# Patient Record
Sex: MALE | Race: WHITE | NOT HISPANIC OR LATINO | Employment: OTHER | URBAN - METROPOLITAN AREA
[De-identification: names, ages, dates, MRNs, and addresses within clinical notes are randomized per-mention and may not be internally consistent; named-entity substitution may affect disease eponyms.]

---

## 2017-12-12 ENCOUNTER — HOSPITAL ENCOUNTER (EMERGENCY)
Facility: HOSPITAL | Age: 77
End: 2017-12-12
Attending: EMERGENCY MEDICINE
Payer: MEDICARE

## 2017-12-12 VITALS
HEIGHT: 69 IN | DIASTOLIC BLOOD PRESSURE: 93 MMHG | SYSTOLIC BLOOD PRESSURE: 143 MMHG | RESPIRATION RATE: 20 BRPM | WEIGHT: 222 LBS | HEART RATE: 85 BPM | TEMPERATURE: 98 F | BODY MASS INDEX: 32.88 KG/M2 | OXYGEN SATURATION: 95 %

## 2017-12-12 DIAGNOSIS — R05.9 COUGH: Primary | ICD-10-CM

## 2017-12-12 DIAGNOSIS — R07.9 CHEST PAIN: ICD-10-CM

## 2017-12-12 LAB
ALBUMIN SERPL BCP-MCNC: 2.8 G/DL
ALP SERPL-CCNC: 109 U/L
ALT SERPL W/O P-5'-P-CCNC: 18 U/L
ANION GAP SERPL CALC-SCNC: 12 MMOL/L
AST SERPL-CCNC: 16 U/L
BASOPHILS # BLD AUTO: 0.03 K/UL
BASOPHILS NFR BLD: 0.5 %
BILIRUB SERPL-MCNC: 0.5 MG/DL
BNP SERPL-MCNC: <10 PG/ML
BUN SERPL-MCNC: 12 MG/DL
CALCIUM SERPL-MCNC: 8.6 MG/DL
CHLORIDE SERPL-SCNC: 105 MMOL/L
CO2 SERPL-SCNC: 20 MMOL/L
CREAT SERPL-MCNC: 0.8 MG/DL
DIFFERENTIAL METHOD: ABNORMAL
EOSINOPHIL # BLD AUTO: 0.2 K/UL
EOSINOPHIL NFR BLD: 2.9 %
ERYTHROCYTE [DISTWIDTH] IN BLOOD BY AUTOMATED COUNT: 14.3 %
EST. GFR  (AFRICAN AMERICAN): >60 ML/MIN/1.73 M^2
EST. GFR  (NON AFRICAN AMERICAN): >60 ML/MIN/1.73 M^2
GLUCOSE SERPL-MCNC: 123 MG/DL
HCT VFR BLD AUTO: 33.4 %
HGB BLD-MCNC: 11.5 G/DL
LYMPHOCYTES # BLD AUTO: 1.8 K/UL
LYMPHOCYTES NFR BLD: 28.9 %
MCH RBC QN AUTO: 31.1 PG
MCHC RBC AUTO-ENTMCNC: 34.4 G/DL
MCV RBC AUTO: 90 FL
MONOCYTES # BLD AUTO: 0.6 K/UL
MONOCYTES NFR BLD: 9.3 %
NEUTROPHILS # BLD AUTO: 3.5 K/UL
NEUTROPHILS NFR BLD: 57.4 %
PLATELET # BLD AUTO: 262 K/UL
PMV BLD AUTO: 8.8 FL
POTASSIUM SERPL-SCNC: 4.4 MMOL/L
PROT SERPL-MCNC: 7.8 G/DL
RBC # BLD AUTO: 3.7 M/UL
SODIUM SERPL-SCNC: 137 MMOL/L
TROPONIN I SERPL DL<=0.01 NG/ML-MCNC: <0.006 NG/ML
WBC # BLD AUTO: 6.13 K/UL

## 2017-12-12 PROCEDURE — 94640 AIRWAY INHALATION TREATMENT: CPT | Performed by: GENERAL PRACTICE

## 2017-12-12 PROCEDURE — 93010 ELECTROCARDIOGRAM REPORT: CPT | Mod: ,,, | Performed by: INTERNAL MEDICINE

## 2017-12-12 PROCEDURE — 83880 ASSAY OF NATRIURETIC PEPTIDE: CPT

## 2017-12-12 PROCEDURE — 99285 EMERGENCY DEPT VISIT HI MDM: CPT | Mod: 25

## 2017-12-12 PROCEDURE — 96375 TX/PRO/DX INJ NEW DRUG ADDON: CPT

## 2017-12-12 PROCEDURE — 99900035 HC TECH TIME PER 15 MIN (STAT): Performed by: GENERAL PRACTICE

## 2017-12-12 PROCEDURE — 93005 ELECTROCARDIOGRAM TRACING: CPT | Performed by: GENERAL PRACTICE

## 2017-12-12 PROCEDURE — 96374 THER/PROPH/DIAG INJ IV PUSH: CPT

## 2017-12-12 PROCEDURE — 80053 COMPREHEN METABOLIC PANEL: CPT

## 2017-12-12 PROCEDURE — 85025 COMPLETE CBC W/AUTO DIFF WBC: CPT

## 2017-12-12 PROCEDURE — 25000242 PHARM REV CODE 250 ALT 637 W/ HCPCS: Performed by: EMERGENCY MEDICINE

## 2017-12-12 PROCEDURE — 93005 ELECTROCARDIOGRAM TRACING: CPT

## 2017-12-12 PROCEDURE — 94760 N-INVAS EAR/PLS OXIMETRY 1: CPT | Performed by: GENERAL PRACTICE

## 2017-12-12 PROCEDURE — 84484 ASSAY OF TROPONIN QUANT: CPT

## 2017-12-12 PROCEDURE — 63600175 PHARM REV CODE 636 W HCPCS: Performed by: EMERGENCY MEDICINE

## 2017-12-12 RX ORDER — FERROUS GLUCONATE 324(38)MG
324 TABLET ORAL
COMMUNITY

## 2017-12-12 RX ORDER — VALPROIC ACID 250 MG/5ML
500 SOLUTION ORAL NIGHTLY
COMMUNITY

## 2017-12-12 RX ORDER — METFORMIN HYDROCHLORIDE 500 MG/1
500 TABLET ORAL 2 TIMES DAILY WITH MEALS
COMMUNITY

## 2017-12-12 RX ORDER — PAROXETINE HYDROCHLORIDE 20 MG/1
20 TABLET, FILM COATED ORAL EVERY MORNING
COMMUNITY

## 2017-12-12 RX ORDER — AMLODIPINE BESYLATE 10 MG/1
10 TABLET ORAL DAILY
COMMUNITY

## 2017-12-12 RX ORDER — ACETAMINOPHEN 325 MG/1
325 TABLET ORAL EVERY 6 HOURS PRN
COMMUNITY

## 2017-12-12 RX ORDER — GUAIFENESIN 100 MG/5ML
200 SOLUTION ORAL 4 TIMES DAILY PRN
COMMUNITY

## 2017-12-12 RX ORDER — TAMSULOSIN HYDROCHLORIDE 0.4 MG/1
1 CAPSULE ORAL DAILY
COMMUNITY

## 2017-12-12 RX ORDER — OXYBUTYNIN CHLORIDE 10 MG/1
10 TABLET, EXTENDED RELEASE ORAL NIGHTLY
COMMUNITY

## 2017-12-12 RX ORDER — QUETIAPINE FUMARATE 50 MG/1
50 TABLET, FILM COATED ORAL NIGHTLY
COMMUNITY

## 2017-12-12 RX ORDER — IPRATROPIUM BROMIDE AND ALBUTEROL SULFATE 2.5; .5 MG/3ML; MG/3ML
3 SOLUTION RESPIRATORY (INHALATION)
Status: COMPLETED | OUTPATIENT
Start: 2017-12-12 | End: 2017-12-12

## 2017-12-12 RX ORDER — VENLAFAXINE HYDROCHLORIDE 150 MG/1
75 CAPSULE, EXTENDED RELEASE ORAL DAILY
COMMUNITY

## 2017-12-12 RX ORDER — ALPRAZOLAM 0.25 MG/1
0.25 TABLET ORAL 4 TIMES DAILY PRN
COMMUNITY

## 2017-12-12 RX ORDER — HYDROMORPHONE HYDROCHLORIDE 4 MG/1
4 TABLET ORAL EVERY 4 HOURS PRN
COMMUNITY

## 2017-12-12 RX ORDER — PREDNISONE 10 MG/1
10 TABLET ORAL DAILY
Qty: 5 TABLET | Refills: 0 | Status: SHIPPED | OUTPATIENT
Start: 2017-12-12 | End: 2017-12-17

## 2017-12-12 RX ORDER — MULTIVITAMIN
1 TABLET ORAL DAILY
COMMUNITY

## 2017-12-12 RX ORDER — ALBUTEROL SULFATE 90 UG/1
2 AEROSOL, METERED RESPIRATORY (INHALATION) EVERY 4 HOURS PRN
Qty: 1 INHALER | Refills: 1 | Status: SHIPPED | OUTPATIENT
Start: 2017-12-12 | End: 2018-12-12

## 2017-12-12 RX ORDER — GABAPENTIN 400 MG/1
400 CAPSULE ORAL 3 TIMES DAILY
COMMUNITY

## 2017-12-12 RX ORDER — CARVEDILOL 6.25 MG/1
6.25 TABLET ORAL 2 TIMES DAILY WITH MEALS
COMMUNITY

## 2017-12-12 RX ORDER — FINASTERIDE 5 MG/1
5 TABLET, FILM COATED ORAL DAILY
COMMUNITY

## 2017-12-12 RX ORDER — TRAMADOL HYDROCHLORIDE 50 MG/1
50 TABLET ORAL EVERY 6 HOURS PRN
COMMUNITY

## 2017-12-12 RX ORDER — METHYLPREDNISOLONE SOD SUCC 125 MG
125 VIAL (EA) INJECTION
Status: COMPLETED | OUTPATIENT
Start: 2017-12-12 | End: 2017-12-12

## 2017-12-12 RX ADMIN — METHYLPREDNISOLONE SODIUM SUCCINATE 125 MG: 125 INJECTION, POWDER, FOR SOLUTION INTRAMUSCULAR; INTRAVENOUS at 08:12

## 2017-12-12 RX ADMIN — LORAZEPAM 1 MG: 2 INJECTION INTRAMUSCULAR; INTRAVENOUS at 08:12

## 2017-12-12 RX ADMIN — IPRATROPIUM BROMIDE AND ALBUTEROL SULFATE 3 ML: .5; 3 SOLUTION RESPIRATORY (INHALATION) at 08:12

## 2017-12-12 NOTE — DISCHARGE INSTRUCTIONS
_______________    He almost certainly has COPD.    Ask his doctor to review the new prescriptions.    Encourage quitting smoking.    Return to the ER as needed.    ________________

## 2017-12-12 NOTE — ED NOTES
Pt belligerent and yelling at staff. Attempted to redirect patient but he is yelling that he can't breathe. Security at bedside.

## 2017-12-12 NOTE — ED PROVIDER NOTES
Encounter Date: 12/12/2017       History     Chief Complaint   Patient presents with    Cough     cough x 2 days. From [facility].      Transported here from nursing home, primary complaint seems to be cough and shortness of breath.  He is a regular smoker, having smoked just before he got on the ambulance stretcher.  He has an extensive medication list including oxygen when necessary but apparently no inhalers and he does not seem to carry a diagnosis of COPD.  He is on chronic pain medication including 4 mg Dilaudid which he had this morning at 5:00.  He has known dementia and agitation.  He may have some sputum production, this is not verified.  No known fever.  He has chronic left hip pain without change.  He has some mild headache.  He is angry, demanding, hostile, and unreasonable with poor interaction with all health care providers, seemingly at baseline as per descriptions and related to dementia.  Denies other specific physical complaints and is in fact demanding to go back to the nursing home.      The history is provided by the patient, the nursing home and the EMS personnel. No  was used.     Review of patient's allergies indicates:   Allergen Reactions    Fentanyl      Respiratory failure     Past Medical History:   Diagnosis Date    Alzheimer disease     Anemia     Anxiety     Arthritis     Faith esophagus     BPH (benign prostatic hyperplasia)     Cerebrovascular disease     Dementia     Depression     GERD (gastroesophageal reflux disease)     HLD (hyperlipidemia)     Hypertension     URI (upper respiratory infection)      Past Surgical History:   Procedure Laterality Date    BACK SURGERY      HIP SURGERY Left      History reviewed. No pertinent family history.  Social History   Substance Use Topics    Smoking status: Current Every Day Smoker     Packs/day: 2.00     Types: Cigarettes    Smokeless tobacco: Never Used    Alcohol use No     Review of  Systems   Constitutional: Negative for chills and fever.   HENT: Negative for congestion, facial swelling, nosebleeds and sinus pressure.    Eyes: Negative for pain and redness.   Respiratory: Positive for cough and shortness of breath. Negative for chest tightness and wheezing.    Cardiovascular: Negative for chest pain, palpitations and leg swelling.   Gastrointestinal: Negative for abdominal distention, abdominal pain, diarrhea, nausea and vomiting.   Endocrine: Negative for cold intolerance, polydipsia and polyphagia.   Genitourinary: Negative for difficulty urinating, dysuria, frequency and hematuria.   Musculoskeletal: Negative for arthralgias, back pain, myalgias and neck pain.   Skin: Negative for color change and rash.   Neurological: Negative for dizziness, weakness, numbness and headaches.   Hematological: Negative for adenopathy. Does not bruise/bleed easily.   Psychiatric/Behavioral: Positive for agitation and behavioral problems.   All other systems reviewed and are negative.      Physical Exam     Initial Vitals [12/12/17 0738]   BP Pulse Resp Temp SpO2   (!) 154/80 83 18 98.3 °F (36.8 °C) 96 %      MAP       104.67         Physical Exam    Nursing note and vitals reviewed.  Constitutional: He appears well-developed and well-nourished. He is not diaphoretic. No distress.   HENT:   Head: Normocephalic and atraumatic.   Mouth/Throat: Oropharynx is clear and moist. No oropharyngeal exudate.   Eyes: Conjunctivae and EOM are normal. Pupils are equal, round, and reactive to light. Right eye exhibits no discharge. Left eye exhibits no discharge. No scleral icterus.   Neck: Normal range of motion. Neck supple. No thyromegaly present. No tracheal deviation present. No JVD present.   Cardiovascular: Normal rate, regular rhythm and normal heart sounds. Exam reveals no gallop and no friction rub.    No murmur heard.  Pulmonary/Chest: No respiratory distress. He has no wheezes. He has rhonchi. He has no rales. He  exhibits no tenderness.   Mild scattered rhonchi   Abdominal: Soft. Bowel sounds are normal. He exhibits no distension and no mass. There is no tenderness. There is no rebound and no guarding.   Musculoskeletal: Normal range of motion. He exhibits no edema or tenderness.   Lymphadenopathy:     He has no cervical adenopathy.   Neurological: He is alert and oriented to person, place, and time. He has normal strength. No cranial nerve deficit.   Skin: Skin is warm and dry. No rash noted. No erythema.   Psychiatric: Thought content normal.   Alert and oriented ×4 but mild to moderate dementia is evident, manifest primarily by aggressive, hostile, and unreasonable outbursts.  Angry, demanding, poor social skills.  No acute change suspected, this does appear to be at baseline as per her nursing home.         ED Course   Procedures  Labs Reviewed   CBC W/ AUTO DIFFERENTIAL - Abnormal; Notable for the following:        Result Value    RBC 3.70 (*)     Hemoglobin 11.5 (*)     Hematocrit 33.4 (*)     MCH 31.1 (*)     MPV 8.8 (*)     All other components within normal limits   COMPREHENSIVE METABOLIC PANEL - Abnormal; Notable for the following:     CO2 20 (*)     Glucose 123 (*)     Calcium 8.6 (*)     Albumin 2.8 (*)     All other components within normal limits   TROPONIN I   B-TYPE NATRIURETIC PEPTIDE     EKG Readings: (Independently Interpreted)   Initial Reading: No STEMI. Rhythm: Normal Sinus Rhythm. Heart Rate: 86. Ectopy: No Ectopy. Conduction: Normal. Axis: Normal.   Long QT/ SA/ borderline EKG       Imaging Results          X-Ray Chest PA And Lateral (Edited Result - FINAL)  Result time 12/12/17 09:03:27    Addendum 1 of 1 by Valentín Almaguer Jr., MD (12/12/17 09:03:27)     Mild vascular congestion.  Pulmonary markings as above.      Electronically signed by: VALENTÍN ALMAGUER MD  Date:     12/12/17  Time:    09:03                Final result by Valentín Almaguer Jr., MD (12/12/17 09:02:34)                 Impression:           Mild vascular congestion.  Ulnar markings as above      Electronically signed by: MEL GASTELUM MD  Date:     12/12/17  Time:    09:02              Narrative:    EXAM:   XR CHEST PA AND LATERAL    CLINICAL HISTORY:  Chest Pain , unspecified    COMPARISON:  None    FINDINGS:   Heart size is normal. Indication some mild central vascular congestion.  Some interstitial pulmonary markings, possible interstitial edema.  No large effusions.  A dense alveolar infiltrates.  Bi-basilar markings, likely some atelectasis.  Aortic calcifications.  No pneumothorax..                                                     ED Course      9:18 AM Calm, resting comfortably, oxygen saturation 91-94% on room air, respiratory rate and heart rate are normal.  Exam is improved, no significant rhonchi or wheezing.  I strongly suspect that he has undiagnosed COPD, and I'm recommending a short course of steroids and the addition of an inhaler when necessary, and to follow-up with his physician for these problems.  Stable for return to nursing home.    Clinical Impression:       1. Cough    2. Chest pain          Disposition:   Disposition: Discharged  Condition: Stable                        Jesus Leiva MD  12/12/17 0919

## 2018-01-21 ENCOUNTER — HOSPITAL ENCOUNTER (EMERGENCY)
Facility: HOSPITAL | Age: 78
End: 2018-01-21
Attending: EMERGENCY MEDICINE
Payer: MEDICARE

## 2018-01-21 VITALS
HEART RATE: 95 BPM | TEMPERATURE: 98 F | BODY MASS INDEX: 30.34 KG/M2 | DIASTOLIC BLOOD PRESSURE: 65 MMHG | WEIGHT: 224 LBS | HEIGHT: 72 IN | OXYGEN SATURATION: 95 % | SYSTOLIC BLOOD PRESSURE: 120 MMHG | RESPIRATION RATE: 20 BRPM

## 2018-01-21 DIAGNOSIS — S60.00XA CONTUSION OF FINGER OF LEFT HAND, INITIAL ENCOUNTER: Primary | ICD-10-CM

## 2018-01-21 PROCEDURE — 29125 APPL SHORT ARM SPLINT STATIC: CPT | Mod: LT

## 2018-01-21 PROCEDURE — 99284 EMERGENCY DEPT VISIT MOD MDM: CPT | Mod: 25

## 2018-01-21 PROCEDURE — 25000003 PHARM REV CODE 250: Performed by: EMERGENCY MEDICINE

## 2018-01-21 RX ORDER — IBUPROFEN 200 MG
600 TABLET ORAL
Status: COMPLETED | OUTPATIENT
Start: 2018-01-21 | End: 2018-01-21

## 2018-01-21 RX ORDER — HYDROCODONE BITARTRATE AND ACETAMINOPHEN 5; 325 MG/1; MG/1
1 TABLET ORAL
Status: COMPLETED | OUTPATIENT
Start: 2018-01-21 | End: 2018-01-21

## 2018-01-21 RX ADMIN — IBUPROFEN 600 MG: 200 TABLET, FILM COATED ORAL at 07:01

## 2018-01-21 RX ADMIN — HYDROCODONE BITARTRATE AND ACETAMINOPHEN 1 TABLET: 5; 325 TABLET ORAL at 07:01

## 2018-01-21 NOTE — ED NOTES
Report given to Sabine at [facility]. Will arrange transport back to [facility].

## 2018-01-21 NOTE — ED NOTES
LOC: The patient is awake, alert and aware of environment with an appropriate affect, the patient is oriented x 3 and speaking appropriately.  APPEARANCE: Patient resting comfortably and in no acute distress, patient is clean and well groomed, patient's clothing is properly fastened.  HEENT: Brief WNL  SKIN: Brief WNL.   MUSCULOSKELETAL: Brief WNL. Except left hand pain, swelling and bruising.   RESPIRATORY: Brief WNL  CARDIAC: Brief WNL  GASTRO: Brief WNL  : Brief WNL  Peripheral Vasc: Brief WNL  NEURO: Brief WNL  PSYCH: Brief WNL

## 2018-01-21 NOTE — ED PROVIDER NOTES
Encounter Date: 1/21/2018       History     Chief Complaint   Patient presents with    Hand Pain     slammed hand in the door yesterday     The history is provided by the patient.   Hand Injury    The incident occurred yesterday. The incident occurred at a nursing home. Injury mechanism: hand got crushed in doorway.  Pain/swelling worse today. The pain is present in the left hand. The quality of the pain is described as aching. The pain is at a severity of 8/10. The pain has been intermittent since the incident. Pertinent negatives include no fever and no malaise/fatigue. He reports no foreign bodies present. The symptoms are aggravated by movement, use and palpation. He has tried nothing for the symptoms.     Review of patient's allergies indicates:   Allergen Reactions    Fentanyl      Respiratory failure     Past Medical History:   Diagnosis Date    Alzheimer disease     Anemia     Anxiety     Arthritis     Faith esophagus     BPH (benign prostatic hyperplasia)     Cerebrovascular disease     Dementia     Depression     GERD (gastroesophageal reflux disease)     HLD (hyperlipidemia)     Hypertension     URI (upper respiratory infection)      Past Surgical History:   Procedure Laterality Date    BACK SURGERY      HIP SURGERY Left      Family History   Problem Relation Age of Onset    Cancer Mother     No Known Problems Father      Social History   Substance Use Topics    Smoking status: Current Every Day Smoker     Packs/day: 2.00     Types: Cigarettes    Smokeless tobacco: Never Used    Alcohol use No     Review of Systems   Constitutional: Negative for fever and malaise/fatigue.   HENT: Negative for sore throat.    Respiratory: Negative for shortness of breath.    Cardiovascular: Negative for chest pain.   Gastrointestinal: Negative for nausea.   Genitourinary: Negative for dysuria.   Musculoskeletal: Positive for arthralgias and joint swelling. Negative for back pain.   Skin: Negative  for rash.   Neurological: Negative for weakness.   Hematological: Does not bruise/bleed easily.   All other systems reviewed and are negative.      Physical Exam     Initial Vitals [01/21/18 0648]   BP Pulse Resp Temp SpO2   (!) 117/57 105 20 98.4 °F (36.9 °C) (!) 94 %      MAP       77         Physical Exam    Nursing note and vitals reviewed.  Constitutional: He appears well-developed and well-nourished.   HENT:   Head: Normocephalic and atraumatic.   Mouth/Throat: Oropharynx is clear and moist.   Eyes: EOM are normal. Pupils are equal, round, and reactive to light.   Neck: Normal range of motion. Neck supple.   Cardiovascular: Normal rate, regular rhythm, normal heart sounds and intact distal pulses.   Pulmonary/Chest: Breath sounds normal. No respiratory distress. He has no wheezes. He has no rhonchi.   Abdominal: Soft. Bowel sounds are normal. There is no rebound.   Musculoskeletal: He exhibits no edema.        Left hand: He exhibits decreased range of motion, tenderness, bony tenderness and swelling. He exhibits normal two-point discrimination, normal capillary refill, no deformity and no laceration.        Hands:  Neurological: He is alert and oriented to person, place, and time. He has normal strength. No cranial nerve deficit or sensory deficit.   Skin: Skin is warm and dry. No rash noted.   Psychiatric: He has a normal mood and affect. His behavior is normal. Judgment and thought content normal.         ED Course   Splint Application  Date/Time: 1/21/2018 7:35 AM  Performed by: ÉHCTOR PALAFOX  Authorized by: HÉCTOR PALAFOX   Consent Done: Not Needed  Location details: left hand  Splint type: ulnar gutter  Supplies used: cotton padding and Ortho-Glass  Post-procedure: The splinted body part was neurovascularly unchanged following the procedure.  Patient tolerance: Patient tolerated the procedure well with no immediate complications        Labs Reviewed - No data to display         Xray read by me as no fx/dx.   Official read as follows:      Imaging Results          X-Ray Hand 3 view Left (Final result)  Result time 01/21/18 07:55:42    Final result by FELIPE Barrientos Sr., MD (01/21/18 07:55:42)                 Impression:      1. There is an acute-appearing incomplete fracture in the medial aspect of the proximal metaphyseal portion of the proximal phalanx of the small finger.   2. There is a healing versus healed transverse fracture in the distal diaphyseal portion of the proximal phalanx of the thumb.   3. There is mild prominence of the soft tissue thickness in the dorsum of the left hand.           Electronically signed by: FELIPE BARRIENTOS MD  Date:     01/21/18  Time:    07:55              Narrative:    3 view x-ray of the left hand    Clinical History:     Pain in the left hand    Findings: There is an acute-appearing incomplete fracture in the medial aspect of the proximal metaphyseal portion of the proximal phalanx of the small finger. There is a healing versus healed transverse fracture in the distal diaphyseal portion of the proximal phalanx of the thumb. There is no dislocation. There is mild prominence of the soft tissue thickness in the dorsum of the left hand.                                               ED Course      Clinical Impression:   The encounter diagnosis was Contusion of finger of left hand, initial encounter.    Disposition:   Disposition: Discharged  Condition: Stable                        Gus Lees MD  01/22/18 [id number]

## 2018-01-22 ENCOUNTER — TELEPHONE (OUTPATIENT)
Dept: EMERGENCY MEDICINE | Facility: HOSPITAL | Age: 78
End: 2018-01-22

## 2018-01-23 NOTE — PROVIDER PROGRESS NOTES - EMERGENCY DEPT.
Encounter Date: 1/21/2018    ED Physician Progress Notes         1/21/2018 6:56 PM Official read of x-ray noted.  + fractur PP L 5th finger.  Pt was placed in appropriate splint prior to DC.  I sent message to charge nurse pool to inform PCP/NH that he has a fx and  needs ortho f/u

## 2018-02-13 ENCOUNTER — HOSPITAL ENCOUNTER (EMERGENCY)
Facility: HOSPITAL | Age: 78
Discharge: ANOTHER HOSPITAL | End: 2018-02-13
Attending: EMERGENCY MEDICINE
Payer: MEDICARE

## 2018-02-13 VITALS
SYSTOLIC BLOOD PRESSURE: 171 MMHG | DIASTOLIC BLOOD PRESSURE: 82 MMHG | HEART RATE: 100 BPM | HEIGHT: 72 IN | WEIGHT: 233 LBS | OXYGEN SATURATION: 95 % | RESPIRATION RATE: 20 BRPM | TEMPERATURE: 99 F | BODY MASS INDEX: 31.56 KG/M2

## 2018-02-13 DIAGNOSIS — G30.9 ALZHEIMER'S DEMENTIA WITH BEHAVIORAL DISTURBANCE, UNSPECIFIED TIMING OF DEMENTIA ONSET: ICD-10-CM

## 2018-02-13 DIAGNOSIS — L03.811 ABSCESS OR CELLULITIS OF SCALP: Primary | ICD-10-CM

## 2018-02-13 DIAGNOSIS — F02.81 ALZHEIMER'S DEMENTIA WITH BEHAVIORAL DISTURBANCE, UNSPECIFIED TIMING OF DEMENTIA ONSET: ICD-10-CM

## 2018-02-13 DIAGNOSIS — I10 HYPERTENSION, UNSPECIFIED TYPE: ICD-10-CM

## 2018-02-13 LAB
ALBUMIN SERPL BCP-MCNC: 3.4 G/DL
ALP SERPL-CCNC: 113 U/L
ALT SERPL W/O P-5'-P-CCNC: 16 U/L
ANION GAP SERPL CALC-SCNC: 9 MMOL/L
AST SERPL-CCNC: 15 U/L
BACTERIA #/AREA URNS AUTO: NORMAL /HPF
BASOPHILS # BLD AUTO: 0.02 K/UL
BASOPHILS NFR BLD: 0.3 %
BILIRUB SERPL-MCNC: 0.4 MG/DL
BILIRUB UR QL STRIP: NEGATIVE
BUN SERPL-MCNC: 15 MG/DL
CALCIUM SERPL-MCNC: 9 MG/DL
CHLORIDE SERPL-SCNC: 101 MMOL/L
CLARITY UR REFRACT.AUTO: CLEAR
CO2 SERPL-SCNC: 25 MMOL/L
COLOR UR AUTO: YELLOW
CREAT SERPL-MCNC: 1.1 MG/DL
DIFFERENTIAL METHOD: ABNORMAL
EOSINOPHIL # BLD AUTO: 0.2 K/UL
EOSINOPHIL NFR BLD: 2.5 %
ERYTHROCYTE [DISTWIDTH] IN BLOOD BY AUTOMATED COUNT: 14.8 %
EST. GFR  (AFRICAN AMERICAN): >60 ML/MIN/1.73 M^2
EST. GFR  (NON AFRICAN AMERICAN): >60 ML/MIN/1.73 M^2
GLUCOSE SERPL-MCNC: 217 MG/DL
GLUCOSE UR QL STRIP: ABNORMAL
HCT VFR BLD AUTO: 38.6 %
HGB BLD-MCNC: 13.1 G/DL
HGB UR QL STRIP: NEGATIVE
HYALINE CASTS UR QL AUTO: 0 /LPF
KETONES UR QL STRIP: NEGATIVE
LEUKOCYTE ESTERASE UR QL STRIP: NEGATIVE
LYMPHOCYTES # BLD AUTO: 2.2 K/UL
LYMPHOCYTES NFR BLD: 27.1 %
MCH RBC QN AUTO: 30.6 PG
MCHC RBC AUTO-ENTMCNC: 33.9 G/DL
MCV RBC AUTO: 90 FL
MICROSCOPIC COMMENT: NORMAL
MONOCYTES # BLD AUTO: 0.7 K/UL
MONOCYTES NFR BLD: 9.3 %
NEUTROPHILS # BLD AUTO: 4.8 K/UL
NEUTROPHILS NFR BLD: 60.5 %
NITRITE UR QL STRIP: NEGATIVE
PH UR STRIP: 6 [PH] (ref 5–8)
PLATELET # BLD AUTO: 269 K/UL
PMV BLD AUTO: 9.2 FL
POTASSIUM SERPL-SCNC: 4.4 MMOL/L
PROT SERPL-MCNC: 8.2 G/DL
PROT UR QL STRIP: ABNORMAL
RBC # BLD AUTO: 4.28 M/UL
RBC #/AREA URNS AUTO: 2 /HPF (ref 0–4)
SODIUM SERPL-SCNC: 135 MMOL/L
SP GR UR STRIP: 1.02 (ref 1–1.03)
URN SPEC COLLECT METH UR: ABNORMAL
UROBILINOGEN UR STRIP-ACNC: NEGATIVE EU/DL
WBC # BLD AUTO: 7.97 K/UL
WBC #/AREA URNS AUTO: 1 /HPF (ref 0–5)
YEAST UR QL AUTO: NORMAL

## 2018-02-13 PROCEDURE — 99285 EMERGENCY DEPT VISIT HI MDM: CPT | Mod: 25

## 2018-02-13 PROCEDURE — 81000 URINALYSIS NONAUTO W/SCOPE: CPT

## 2018-02-13 PROCEDURE — 85025 COMPLETE CBC W/AUTO DIFF WBC: CPT

## 2018-02-13 PROCEDURE — 63600175 PHARM REV CODE 636 W HCPCS: Performed by: EMERGENCY MEDICINE

## 2018-02-13 PROCEDURE — 96366 THER/PROPH/DIAG IV INF ADDON: CPT

## 2018-02-13 PROCEDURE — 80053 COMPREHEN METABOLIC PANEL: CPT

## 2018-02-13 PROCEDURE — 96375 TX/PRO/DX INJ NEW DRUG ADDON: CPT

## 2018-02-13 PROCEDURE — 25000003 PHARM REV CODE 250: Performed by: EMERGENCY MEDICINE

## 2018-02-13 PROCEDURE — 87040 BLOOD CULTURE FOR BACTERIA: CPT

## 2018-02-13 PROCEDURE — 96365 THER/PROPH/DIAG IV INF INIT: CPT

## 2018-02-13 PROCEDURE — 93005 ELECTROCARDIOGRAM TRACING: CPT

## 2018-02-13 PROCEDURE — 93010 ELECTROCARDIOGRAM REPORT: CPT | Mod: ,,, | Performed by: INTERNAL MEDICINE

## 2018-02-13 RX ORDER — CEFEPIME HYDROCHLORIDE 2 G/1
2 INJECTION, POWDER, FOR SOLUTION INTRAVENOUS
Status: COMPLETED | OUTPATIENT
Start: 2018-02-13 | End: 2018-02-13

## 2018-02-13 RX ORDER — HYDROMORPHONE HYDROCHLORIDE 2 MG/ML
2 INJECTION, SOLUTION INTRAMUSCULAR; INTRAVENOUS; SUBCUTANEOUS ONCE
Status: COMPLETED | OUTPATIENT
Start: 2018-02-13 | End: 2018-02-13

## 2018-02-13 RX ORDER — VANCOMYCIN/0.9 % SOD CHLORIDE 1 G/100 ML
1000 PLASTIC BAG, INJECTION (ML) INTRAVENOUS
Status: DISCONTINUED | OUTPATIENT
Start: 2018-02-14 | End: 2018-02-14 | Stop reason: HOSPADM

## 2018-02-13 RX ORDER — VANCOMYCIN/0.9 % SOD CHLORIDE 1 G/100 ML
1000 PLASTIC BAG, INJECTION (ML) INTRAVENOUS
Status: COMPLETED | OUTPATIENT
Start: 2018-02-13 | End: 2018-02-13

## 2018-02-13 RX ORDER — HYDROCODONE BITARTRATE AND ACETAMINOPHEN 10; 325 MG/1; MG/1
1 TABLET ORAL
Status: DISCONTINUED | OUTPATIENT
Start: 2018-02-13 | End: 2018-02-13

## 2018-02-13 RX ORDER — ALPRAZOLAM 0.25 MG/1
0.5 TABLET ORAL
Status: COMPLETED | OUTPATIENT
Start: 2018-02-13 | End: 2018-02-13

## 2018-02-13 RX ADMIN — ALPRAZOLAM 0.5 MG: 0.25 TABLET ORAL at 07:02

## 2018-02-13 RX ADMIN — VANCOMYCIN HCL-SODIUM CHLORIDE IV SOLN 1 GM/250ML-0.9% 1 G: 1-0.9/25 SOLUTION at 07:02

## 2018-02-13 RX ADMIN — CEFEPIME 2 G: 2 INJECTION, POWDER, FOR SOLUTION INTRAVENOUS at 08:02

## 2018-02-13 RX ADMIN — HYDROMORPHONE HYDROCHLORIDE 2 MG: 2 INJECTION, SOLUTION INTRAMUSCULAR; INTRAVENOUS; SUBCUTANEOUS at 07:02

## 2018-02-13 NOTE — ED PROVIDER NOTES
Encounter Date: 2/13/2018       History     Chief Complaint   Patient presents with    Abscess     since fri abscess to right side of face with redness extending to right cheek area.currently on antibotics for dental problems       Abscess    This is a new problem. The current episode started several days ago. The problem occurs rarely. The problem has been unchanged. The abscess is present on the scalp (with erythema and edema to right side of face.  seen at [facility] 2 days ago and rx'd clindamycin.  pt states erythema and edema of face is improving but abscess on head has worsened). Pain scale: mild. The abscess is characterized by redness, painfulness and swelling. Pertinent negatives include no anorexia, no decrease in physical activity, not sleeping less, not drinking less, no fever, no diarrhea, no vomiting, no congestion, no rhinorrhea, no sore throat, no decreased responsiveness and no cough. His past medical history does not include atopy in family or skin abscesses in family. There were no sick contacts. Recently, medical care has been given at another facility (rx'd clindamycin). Services received include medications given.     Review of patient's allergies indicates:   Allergen Reactions    Fentanyl      Respiratory failure     Past Medical History:   Diagnosis Date    Alzheimer disease     Anemia     Anxiety     Arthritis     Faith esophagus     BPH (benign prostatic hyperplasia)     Cerebrovascular disease     Dementia     Depression     GERD (gastroesophageal reflux disease)     HLD (hyperlipidemia)     Hypertension     URI (upper respiratory infection)      Past Surgical History:   Procedure Laterality Date    BACK SURGERY      HIP SURGERY Left      Family History   Problem Relation Age of Onset    Cancer Mother     No Known Problems Father      Social History   Substance Use Topics    Smoking status: Current Every Day Smoker     Packs/day: 2.00     Types: Cigarettes    Smokeless  tobacco: Never Used    Alcohol use No     Review of Systems   Constitutional: Negative for decreased responsiveness and fever.   HENT: Negative for congestion, rhinorrhea and sore throat.    Respiratory: Negative for cough and shortness of breath.    Cardiovascular: Negative for chest pain.   Gastrointestinal: Negative for anorexia, diarrhea, nausea and vomiting.   Genitourinary: Negative for dysuria.   Musculoskeletal: Negative for back pain.   Skin: Negative for rash.   Neurological: Negative for weakness.   Hematological: Does not bruise/bleed easily.   All other systems reviewed and are negative.      Physical Exam     Initial Vitals [02/13/18 1608]   BP Pulse Resp Temp SpO2   (!) 163/73 96 20 99 °F (37.2 °C) 96 %      MAP       103         Physical Exam    Nursing note and vitals reviewed.  Constitutional: He appears well-developed and well-nourished. He is not diaphoretic. No distress.   HENT:   Head: Normocephalic and atraumatic.   Eyes: EOM are normal. Pupils are equal, round, and reactive to light. No scleral icterus.   Neck: Normal range of motion. Neck supple. No thyromegaly present.   Cardiovascular: Normal rate, regular rhythm, normal heart sounds and intact distal pulses. Exam reveals no gallop and no friction rub.    No murmur heard.  Pulmonary/Chest: Breath sounds normal. No respiratory distress. He has no wheezes. He has no rhonchi. He exhibits no tenderness.   Abdominal: Soft. Bowel sounds are normal. He exhibits no distension. There is no tenderness. There is no rebound and no guarding.   Musculoskeletal: Normal range of motion. He exhibits no edema or tenderness.   Lymphadenopathy:     He has no cervical adenopathy.   Neurological: He is alert and oriented to person, place, and time. He has normal strength. No cranial nerve deficit or sensory deficit.   Skin: Skin is warm and dry.   Psychiatric: He has a normal mood and affect. His behavior is normal. Judgment and thought content normal.          ED Course   Procedures  Labs Reviewed   CBC W/ AUTO DIFFERENTIAL - Abnormal; Notable for the following:        Result Value    RBC 4.28 (*)     Hemoglobin 13.1 (*)     Hematocrit 38.6 (*)     RDW 14.8 (*)     All other components within normal limits   COMPREHENSIVE METABOLIC PANEL - Abnormal; Notable for the following:     Sodium 135 (*)     Glucose 217 (*)     Albumin 3.4 (*)     All other components within normal limits   URINALYSIS - Abnormal; Notable for the following:     Protein, UA 1+ (*)     Glucose, UA 3+ (*)     All other components within normal limits   CULTURE, BLOOD   CULTURE, BLOOD   URINALYSIS MICROSCOPIC     EKG Readings: (Independently Interpreted)   Initial Reading: No STEMI. Rhythm: Normal Sinus Rhythm. Heart Rate: 91. Ectopy: No Ectopy. Conduction: Normal. ST Segments: Normal ST Segments. T Waves: Normal. Axis: Normal. Clinical Impression: Normal Sinus Rhythm       Vitals:    02/13/18 1608 02/13/18 2019   BP: (!) 163/73 (!) 164/67   Pulse: 96 96   Resp: 20 20   Temp: 99 °F (37.2 °C) 99.2 °F (37.3 °C)   TempSrc: Oral Oral   SpO2: 96% 95%   Weight: 105.7 kg (233 lb)    Height: 6' (1.829 m)        Results for orders placed or performed during the hospital encounter of 02/13/18   CBC auto differential   Result Value Ref Range    WBC 7.97 3.90 - 12.70 K/uL    RBC 4.28 (L) 4.60 - 6.20 M/uL    Hemoglobin 13.1 (L) 14.0 - 18.0 g/dL    Hematocrit 38.6 (L) 40.0 - 54.0 %    MCV 90 82 - 98 fL    MCH 30.6 27.0 - 31.0 pg    MCHC 33.9 32.0 - 36.0 g/dL    RDW 14.8 (H) 11.5 - 14.5 %    Platelets 269 150 - 350 K/uL    MPV 9.2 9.2 - 12.9 fL    Gran # (ANC) 4.8 1.8 - 7.7 K/uL    Lymph # 2.2 1.0 - 4.8 K/uL    Mono # 0.7 0.3 - 1.0 K/uL    Eos # 0.2 0.0 - 0.5 K/uL    Baso # 0.02 0.00 - 0.20 K/uL    Gran% 60.5 38.0 - 73.0 %    Lymph% 27.1 18.0 - 48.0 %    Mono% 9.3 4.0 - 15.0 %    Eosinophil% 2.5 0.0 - 8.0 %    Basophil% 0.3 0.0 - 1.9 %    Differential Method Automated    Comprehensive metabolic panel   Result  Value Ref Range    Sodium 135 (L) 136 - 145 mmol/L    Potassium 4.4 3.5 - 5.1 mmol/L    Chloride 101 95 - 110 mmol/L    CO2 25 23 - 29 mmol/L    Glucose 217 (H) 70 - 110 mg/dL    BUN, Bld 15 8 - 23 mg/dL    Creatinine 1.1 0.5 - 1.4 mg/dL    Calcium 9.0 8.7 - 10.5 mg/dL    Total Protein 8.2 6.0 - 8.4 g/dL    Albumin 3.4 (L) 3.5 - 5.2 g/dL    Total Bilirubin 0.4 0.1 - 1.0 mg/dL    Alkaline Phosphatase 113 55 - 135 U/L    AST 15 10 - 40 U/L    ALT 16 10 - 44 U/L    Anion Gap 9 8 - 16 mmol/L    eGFR if African American >60.0 >60 mL/min/1.73 m^2    eGFR if non African American >60.0 >60 mL/min/1.73 m^2   Urinalysis   Result Value Ref Range    Specimen UA Urine, Clean Catch     Color, UA Yellow Yellow, Straw, Valarie    Appearance, UA Clear Clear    pH, UA 6.0 5.0 - 8.0    Specific Gravity, UA 1.020 1.005 - 1.030    Protein, UA 1+ (A) Negative    Glucose, UA 3+ (A) Negative    Ketones, UA Negative Negative    Bilirubin (UA) Negative Negative    Occult Blood UA Negative Negative    Nitrite, UA Negative Negative    Urobilinogen, UA Negative <2.0 EU/dL    Leukocytes, UA Negative Negative   Urinalysis Microscopic   Result Value Ref Range    RBC, UA 2 0 - 4 /hpf    WBC, UA 1 0 - 5 /hpf    Bacteria, UA Rare None-Occ /hpf    Yeast, UA None None    Hyaline Casts, UA 0 0-1/lpf /lpf    Microscopic Comment SEE COMMENT          Imaging Results          CT Maxillofacial Without Contrast (Final result)  Result time 02/13/18 18:11:33    Final result by Dee Salas MD (Timothy) (02/13/18 18:11:33)                 Impression:     Focal scalp swelling on the right overlying the squamosal portion of the temporal bone in the temporalis region.  There appears to be focal thickening of the overlying skin which could be related to the source of suspected infection.  No definite fluid collection to suggest abscess.  Skull is intact.  Teeth are intact.        All CT scans at this facility use dose modulation, iterative reconstruction and/or  weight based dosing when appropriate to reduce radiation dose to as low as reasonably achievable.       Electronically signed by: LISSET HUNT MD  Date:     02/13/18  Time:    18:11              Narrative:    CT MAXILLOFACIAL WITHOUT CONTRAST, 02/13/18 17:58:32    History:scalp abscess      Standard axial and coronal facial bone CT performed.    No previous for comparison.    The frontal sinuses are clear.  Ethmoid sinuses are clear.  Sphenoid sinuses are clear.  There is mucosal thickening involving the left maxillary sinus measuring up to 9.4 mm.  Findings compatible sinusitis.  The right sphenoid sinus is clear.  There is scalp swelling noted on the right involving the temporalis region.  There is associated focal thickening of the overlying skin.  This could represent the cause of infection.  No dental caries or periapical abscess.  The mandible is intact.                             CT Head Without Contrast (Final result)  Result time 02/13/18 18:07:14    Final result by Lisset Hunt MD (Timothy) (02/13/18 18:07:14)                 Impression:         No acute intracranial abnormality.  The skull is intact.  Soft tissue swelling involving the scalp on the right which could be related to infection.    all ct exams at this facility use dose modulation, iterative reconstruction, and /or weight based dosing to reduce radiation dose to low as reasonably achievable.      Electronically signed by: LISSET HUNT MD  Date:     02/13/18  Time:    18:07              Narrative:    Exam: Noncontrast CT head    History:     Head trauma    Findings: Atrophy is present. No acute intracranial hemorrhage or focal brain parenchymal abnormality is identified. Calvarium is intact.  There is evidence of soft tissue swelling involving the scalp on the right likely correspond to soft tissue infection as suggested by clinical history.                             X-Ray Chest AP Portable (Final result)  Result time 02/13/18  18:13:01    Final result by Dee Hunt MD (Timothy) (02/13/18 18:13:01)                 Impression:     Comparison 12/12/2017.  Atherosclerosis of the thoracic aorta.Mild cardiomegaly. There is a stable increased markings at the left base possibly related to atelectasis or lobar infiltrate.      Electronically signed by: DEE HUNT MD  Date:     02/13/18  Time:    18:13              Narrative:    Chest, 1 view.    Clinical History: Sepsis                              Medications   ceFEPIme injection 2 g (not administered)   vancomycin 1 gram/250 mL in sodium chloride 0.9% IVPB 1 g (not administered)   vancomycin 1 gram/250 mL in sodium chloride 0.9% IVPB 1 g (1 g Intravenous New Bag 2/13/18 1730)   hydromorphone (PF) injection 2 mg (2 mg Intravenous Given 2/13/18 1911)   ALPRAZolam tablet 0.5 mg (0.5 mg Oral Given 2/13/18 1911)           Pre-hypertension/Hypertension: The pt has been informed that they may have pre-hypertension or hypertension based on a blood pressure reading in the ED. I recommend that the pt call the PCP listed on their discharge instructions or a physician of their choice this week to arrange f/u for further evaluation of possible pre-hypertension or hypertension.     Quique Mon was given a handout which discussed their disease process, precautions, and instructions for follow-up and therapy.        New Prescriptions    No medications on file          ED Diagnosis  1. Abscess or cellulitis of scalp    2. Hypertension, unspecified type    3. Alzheimer's dementia with behavioral disturbance, unspecified timing of dementia onset           Medical Decision Making:   ED Management:  The patient was received from the off-going emergency room physician Dr Singh at 6:00PM.  All pertinent details presently available from the patient encounter were discussed along with the expected plan for disposition.      All historical, clinical, radiographic, and laboratory findings were reviewed  with the patient/family in detail along with the indications for transfer to an outside facility (rather than admission to our facility in [place]) secondary to patient preference and a need for  ENT consultation and IV ABX given the diagnosis of scalp cellulitis/abscess with concern for deep soft tissue infection given CT findings.  I have initiated Vanc and Cefepime.  All remaining questions and concerns were addressed at that time and the patient/family communicates understanding and agrees to proceed accordingly.  Similarly all pertinent details of the encounter were discussed with Dr Montoya at [facility] ED via [facility] who agrees to accept the patient in transfer based on the needs/patient preferences outlined above.  [facility] secondary to a need for ongoing IVF and IV ABX en route.  Jarad Thacker MD  8:24 PM                           ED Course      Clinical Impression:   The primary encounter diagnosis was Abscess or cellulitis of scalp. Diagnoses of Hypertension, unspecified type and Alzheimer's dementia with behavioral disturbance, unspecified timing of dementia onset were also pertinent to this visit.                           Jarad Thacker MD  02/13/18 2027

## 2018-02-14 NOTE — ED NOTES
"Patient verbally verified and Spelled Full Name and Date of Birth.  Pt is shouting obscenities & talking incoherently about [place] war days, C/O pain to right side of head that has become worse over past few days according to pt. LOC: The patient is awake, alert and aware of environment the patient is oriented x 2 & answers simple questions appropriately (, current year) then immediatlely starts ranting about "my war days & previous injuries".    APPEARANCE: Patient  is clean and well groomed, patient's clothing is properly fastened.  HEENT: Brief WNL except for large abscess to right temporal area, entire right side of face red & swollen  SKIN: Brief WNL.   MUSCULOSKELETAL: Brief WNL, MAEW while sitting up in wheelchair (reffuses to get on stretcher)  RESPIRATORY: Brief WNL, chest clear chely, no SOB , wheezes or rhonchi  CARDIAC: Brief WNL, denies chest pain, heartrate regular (will not leave monitor leads, BP cuff or O2 sat monitor on  GASTRO: Brief WNL, nontender, denies N,V or D  : Brief WNL, pt deneis difficulty voiding  Peripheral Vasc: Brief WNL  NEURO: Brief WNL  PSYCH: Brief WNL  "

## 2018-02-14 NOTE — ED NOTES
"Patient refuses to have IV started; patient states: "I want to get the fuck out of her now and you aren't going to stick me with no needle". Dr. Thacker informed and is at bedside to speak with patient.  "

## 2018-02-14 NOTE — ED NOTES
"Pt quiet, states '' the pain to my head is much better, but I know its coming back".  Assisted with changing positions as to not lay on right side  & place more pressure on abscess.   SR up x 2, callbell in hand  "

## 2018-02-14 NOTE — ED NOTES
Spoke with Gonzalo with [facility] dispatch who states unit is currently coming from [place] to transport patient & should arrive here in ~ 20- 30 minutes.

## 2018-02-14 NOTE — ED NOTES
"[facility] deputy at bedside with patient for safety measures. Patient tells deputy: "you want to whip my ass; c'mon, let's get it on"  "

## 2018-02-14 NOTE — CONSULTS
Quique Mon [id number] is a 77 y.o. male who has been consulted for vancomycin dosing.    Dx: Cellulitis, Abscess  Goal trough 15-20    The patient has the following labs:     Date Creatinine (mg/dl)    BUN WBC Count   2/13/2018 Estimated Creatinine Clearance: 70.6 mL/min (based on SCr of 1.1 mg/dL). Lab Results   Component Value Date    BUN 15 02/13/2018     Lab Results   Component Value Date    WBC 7.97 02/13/2018        Current weight is 105.7 kg (233 lb)    The patient will be started on vancomycin at a dose of 1,000 mg every 12 hours. Patient will be followed by pharmacy for changes in renal function, toxicity, and efficacy.  The vancomycin trough has been ordered for 2/15 at 0430.      Thank you for allowing us to participate in this patient's care.     Nellie Echevarria

## 2018-02-16 ENCOUNTER — HOSPITAL ENCOUNTER (EMERGENCY)
Facility: HOSPITAL | Age: 78
Discharge: HOME | End: 2018-02-16
Attending: EMERGENCY MEDICINE
Payer: MEDICARE

## 2018-02-16 VITALS
TEMPERATURE: 99 F | WEIGHT: 221 LBS | SYSTOLIC BLOOD PRESSURE: 137 MMHG | OXYGEN SATURATION: 97 % | HEART RATE: 89 BPM | DIASTOLIC BLOOD PRESSURE: 70 MMHG | RESPIRATION RATE: 20 BRPM | BODY MASS INDEX: 29.97 KG/M2

## 2018-02-16 DIAGNOSIS — F02.80 ALZHEIMER'S DEMENTIA WITHOUT BEHAVIORAL DISTURBANCE, UNSPECIFIED TIMING OF DEMENTIA ONSET: Primary | ICD-10-CM

## 2018-02-16 DIAGNOSIS — R45.851 SUICIDAL IDEATION: ICD-10-CM

## 2018-02-16 DIAGNOSIS — G30.9 ALZHEIMER'S DEMENTIA WITHOUT BEHAVIORAL DISTURBANCE, UNSPECIFIED TIMING OF DEMENTIA ONSET: Primary | ICD-10-CM

## 2018-02-16 LAB
ALBUMIN SERPL BCP-MCNC: 3.2 G/DL
ALP SERPL-CCNC: 87 U/L
ALT SERPL W/O P-5'-P-CCNC: 13 U/L
AMPHET+METHAMPHET UR QL: NEGATIVE
ANION GAP SERPL CALC-SCNC: 9 MMOL/L
APAP SERPL-MCNC: <3 UG/ML
AST SERPL-CCNC: 15 U/L
BACTERIA #/AREA URNS AUTO: NORMAL /HPF
BARBITURATES UR QL SCN>200 NG/ML: NEGATIVE
BASOPHILS # BLD AUTO: 0.02 K/UL
BASOPHILS NFR BLD: 0.3 %
BENZODIAZ UR QL SCN>200 NG/ML: NEGATIVE
BILIRUB SERPL-MCNC: 0.4 MG/DL
BILIRUB UR QL STRIP: NEGATIVE
BUN SERPL-MCNC: 14 MG/DL
BZE UR QL SCN: NEGATIVE
CALCIUM SERPL-MCNC: 8.8 MG/DL
CANNABINOIDS UR QL SCN: NEGATIVE
CHLORIDE SERPL-SCNC: 104 MMOL/L
CLARITY UR REFRACT.AUTO: CLEAR
CO2 SERPL-SCNC: 23 MMOL/L
COLOR UR AUTO: ABNORMAL
CREAT SERPL-MCNC: 0.8 MG/DL
CREAT UR-MCNC: 131.7 MG/DL
DIFFERENTIAL METHOD: ABNORMAL
EOSINOPHIL # BLD AUTO: 0.2 K/UL
EOSINOPHIL NFR BLD: 2.7 %
ERYTHROCYTE [DISTWIDTH] IN BLOOD BY AUTOMATED COUNT: 14.2 %
EST. GFR  (AFRICAN AMERICAN): >60 ML/MIN/1.73 M^2
EST. GFR  (NON AFRICAN AMERICAN): >60 ML/MIN/1.73 M^2
ETHANOL SERPL-MCNC: <10 MG/DL
GLUCOSE SERPL-MCNC: 224 MG/DL
GLUCOSE UR QL STRIP: ABNORMAL
HCT VFR BLD AUTO: 36 %
HGB BLD-MCNC: 12.4 G/DL
HGB UR QL STRIP: NEGATIVE
HYALINE CASTS UR QL AUTO: 0 /LPF
KETONES UR QL STRIP: ABNORMAL
LEUKOCYTE ESTERASE UR QL STRIP: NEGATIVE
LYMPHOCYTES # BLD AUTO: 1.6 K/UL
LYMPHOCYTES NFR BLD: 27.1 %
MCH RBC QN AUTO: 30.8 PG
MCHC RBC AUTO-ENTMCNC: 34.4 G/DL
MCV RBC AUTO: 89 FL
METHADONE UR QL SCN>300 NG/ML: NEGATIVE
MICROSCOPIC COMMENT: NORMAL
MONOCYTES # BLD AUTO: 0.4 K/UL
MONOCYTES NFR BLD: 7.4 %
NEUTROPHILS # BLD AUTO: 3.7 K/UL
NEUTROPHILS NFR BLD: 62.2 %
NITRITE UR QL STRIP: NEGATIVE
OPIATES UR QL SCN: NORMAL
OTHER ELEMENTS URNS MICRO: NORMAL
PCP UR QL SCN>25 NG/ML: NEGATIVE
PH UR STRIP: 7 [PH] (ref 5–8)
PLATELET # BLD AUTO: 277 K/UL
PMV BLD AUTO: 8.8 FL
POTASSIUM SERPL-SCNC: 4.2 MMOL/L
PROT SERPL-MCNC: 7.9 G/DL
PROT UR QL STRIP: ABNORMAL
RBC # BLD AUTO: 4.03 M/UL
RBC #/AREA URNS AUTO: 0 /HPF (ref 0–4)
SODIUM SERPL-SCNC: 136 MMOL/L
SP GR UR STRIP: 1.02 (ref 1–1.03)
TOXICOLOGY INFORMATION: NORMAL
TSH SERPL DL<=0.005 MIU/L-ACNC: 2.42 UIU/ML
URN SPEC COLLECT METH UR: ABNORMAL
UROBILINOGEN UR STRIP-ACNC: <2 EU/DL
WBC # BLD AUTO: 5.95 K/UL
WBC #/AREA URNS AUTO: 3 /HPF (ref 0–5)

## 2018-02-16 PROCEDURE — G0425 INPT/ED TELECONSULT30: HCPCS | Mod: GT,,, | Performed by: PSYCHIATRY & NEUROLOGY

## 2018-02-16 PROCEDURE — 85025 COMPLETE CBC W/AUTO DIFF WBC: CPT

## 2018-02-16 PROCEDURE — 84443 ASSAY THYROID STIM HORMONE: CPT

## 2018-02-16 PROCEDURE — 80320 DRUG SCREEN QUANTALCOHOLS: CPT

## 2018-02-16 PROCEDURE — 80329 ANALGESICS NON-OPIOID 1 OR 2: CPT

## 2018-02-16 PROCEDURE — 81000 URINALYSIS NONAUTO W/SCOPE: CPT | Mod: 59

## 2018-02-16 PROCEDURE — 80307 DRUG TEST PRSMV CHEM ANLYZR: CPT

## 2018-02-16 PROCEDURE — 80053 COMPREHEN METABOLIC PANEL: CPT

## 2018-02-16 PROCEDURE — 99284 EMERGENCY DEPT VISIT MOD MDM: CPT

## 2018-02-17 NOTE — ED PROVIDER NOTES
"Encounter Date: 2/16/2018       History     Chief Complaint   Patient presents with    Psychiatric Evaluation     patient sent for eval; patient denies any thoughts of harming self or anyone else     The history is provided by the patient and the nursing home.   Mental Health Problem   The primary symptoms do not include hallucinations, negative symptoms or somatic symptoms. The current episode started today. This is a new problem.   The onset of the illness is precipitated by stressful event (pt just had facial abscess I&D'd at [facility].  discharged today.). The degree of incapacity that he is experiencing as a consequence of his illness is mild (pt has hx of Alzheimers). Additional symptoms of the illness include agitation. Additional symptoms of the illness do not include anhedonia, insomnia, hypersomnia, appetite change, unexpected weight change, fatigue, psychomotor retardation, feelings of worthlessness, attention impairment, euphoric mood, increased goal-directed activity, flight of ideas, inflated self-esteem, decreased need for sleep, distractible, poor judgment, visual change, headaches, abdominal pain or seizures. He does not admit to suicidal ideas (pt ademently denies suicidal ideations.  when he gets aggitated from cleaning his facial wound, pt gets mad and states he "wants to go".  once he calms down, pt is extremely pleasent and states he has no access to weapons.). He does not have a plan to commit suicide. He does not contemplate harming himself. He has not already injured self. He does not contemplate injuring another person. He has not already  injured another person.     Review of patient's allergies indicates:   Allergen Reactions    Fentanyl      Respiratory failure     Past Medical History:   Diagnosis Date    Alzheimer disease     Anemia     Anxiety     Arthritis     Faith esophagus     BPH (benign prostatic hyperplasia)     Cerebrovascular disease     Dementia     Depression     " GERD (gastroesophageal reflux disease)     HLD (hyperlipidemia)     Hypertension     URI (upper respiratory infection)      Past Surgical History:   Procedure Laterality Date    BACK SURGERY      HIP SURGERY Left      Family History   Problem Relation Age of Onset    Cancer Mother     No Known Problems Father      Social History   Substance Use Topics    Smoking status: Current Every Day Smoker     Packs/day: 2.00     Types: Cigarettes    Smokeless tobacco: Never Used    Alcohol use No     Review of Systems   Constitutional: Negative for appetite change, fatigue, fever and unexpected weight change.   HENT: Negative for sore throat.    Respiratory: Negative for shortness of breath.    Cardiovascular: Negative for chest pain.   Gastrointestinal: Negative for abdominal pain and nausea.   Genitourinary: Negative for dysuria.   Musculoskeletal: Negative for back pain.   Skin: Negative for rash.   Neurological: Negative for seizures, weakness and headaches.   Hematological: Does not bruise/bleed easily.   Psychiatric/Behavioral: Positive for agitation. Negative for hallucinations. The patient does not have insomnia.        Physical Exam     Initial Vitals [02/16/18 2028]   BP Pulse Resp Temp SpO2   (!) 158/72 91 20 97.8 °F (36.6 °C) 95 %      MAP       100.67         Physical Exam    Nursing note and vitals reviewed.  Constitutional: He appears well-developed and well-nourished. He is not diaphoretic. No distress.   HENT:   Head: Normocephalic and atraumatic.   Eyes: EOM are normal. Pupils are equal, round, and reactive to light. No scleral icterus.   Neck: Normal range of motion. Neck supple. No thyromegaly present.   Cardiovascular: Normal rate, regular rhythm, normal heart sounds and intact distal pulses. Exam reveals no gallop and no friction rub.    No murmur heard.  Pulmonary/Chest: Breath sounds normal. No respiratory distress. He has no wheezes. He has no rhonchi. He exhibits no tenderness.   Abdominal:  Soft. Bowel sounds are normal. He exhibits no distension. There is no tenderness. There is no rebound and no guarding.   Musculoskeletal: Normal range of motion. He exhibits no edema or tenderness.   Lymphadenopathy:     He has no cervical adenopathy.   Neurological: He is alert and oriented to person, place, and time. He has normal strength. No cranial nerve deficit or sensory deficit. He displays a negative Romberg sign. GCS eye subscore is 4. GCS verbal subscore is 5. GCS motor subscore is 6.   Skin: Skin is warm and dry.   Psychiatric: His speech is normal and behavior is normal. Thought content normal. His mood appears not anxious. His affect is blunt. Thought content is not paranoid and not delusional. Cognition and memory are impaired. He does not express impulsivity or inappropriate judgment. He does not exhibit a depressed mood. He expresses no homicidal and no suicidal ideation. He expresses no suicidal plans and no homicidal plans.         ED Course   Procedures  Labs Reviewed   CBC W/ AUTO DIFFERENTIAL - Abnormal; Notable for the following:        Result Value    RBC 4.03 (*)     Hemoglobin 12.4 (*)     Hematocrit 36.0 (*)     MPV 8.8 (*)     All other components within normal limits   COMPREHENSIVE METABOLIC PANEL - Abnormal; Notable for the following:     Glucose 224 (*)     Albumin 3.2 (*)     All other components within normal limits   URINALYSIS - Abnormal; Notable for the following:     Protein, UA 2+ (*)     Glucose, UA Trace (*)     Ketones, UA Trace (*)     All other components within normal limits   ACETAMINOPHEN LEVEL - Abnormal; Notable for the following:     Acetaminophen (Tylenol), Serum <3.0 (*)     All other components within normal limits   TSH   DRUG SCREEN PANEL, URINE EMERGENCY   ALCOHOL,MEDICAL (ETHANOL)   URINALYSIS MICROSCOPIC          Vitals:    02/16/18 2028 02/16/18 2125 02/16/18 2330   BP: (!) 158/72  137/70   Pulse: 91  89   Resp: 20  20   Temp: 97.8 °F (36.6 °C)  98.5 °F  (36.9 °C)   TempSrc: Oral  Oral   SpO2: 95%  97%   Weight:  100.2 kg (221 lb)        Results for orders placed or performed during the hospital encounter of 02/16/18   CBC auto differential   Result Value Ref Range    WBC 5.95 3.90 - 12.70 K/uL    RBC 4.03 (L) 4.60 - 6.20 M/uL    Hemoglobin 12.4 (L) 14.0 - 18.0 g/dL    Hematocrit 36.0 (L) 40.0 - 54.0 %    MCV 89 82 - 98 fL    MCH 30.8 27.0 - 31.0 pg    MCHC 34.4 32.0 - 36.0 g/dL    RDW 14.2 11.5 - 14.5 %    Platelets 277 150 - 350 K/uL    MPV 8.8 (L) 9.2 - 12.9 fL    Gran # (ANC) 3.7 1.8 - 7.7 K/uL    Lymph # 1.6 1.0 - 4.8 K/uL    Mono # 0.4 0.3 - 1.0 K/uL    Eos # 0.2 0.0 - 0.5 K/uL    Baso # 0.02 0.00 - 0.20 K/uL    Gran% 62.2 38.0 - 73.0 %    Lymph% 27.1 18.0 - 48.0 %    Mono% 7.4 4.0 - 15.0 %    Eosinophil% 2.7 0.0 - 8.0 %    Basophil% 0.3 0.0 - 1.9 %    Differential Method Automated    Comprehensive metabolic panel   Result Value Ref Range    Sodium 136 136 - 145 mmol/L    Potassium 4.2 3.5 - 5.1 mmol/L    Chloride 104 95 - 110 mmol/L    CO2 23 23 - 29 mmol/L    Glucose 224 (H) 70 - 110 mg/dL    BUN, Bld 14 8 - 23 mg/dL    Creatinine 0.8 0.5 - 1.4 mg/dL    Calcium 8.8 8.7 - 10.5 mg/dL    Total Protein 7.9 6.0 - 8.4 g/dL    Albumin 3.2 (L) 3.5 - 5.2 g/dL    Total Bilirubin 0.4 0.1 - 1.0 mg/dL    Alkaline Phosphatase 87 55 - 135 U/L    AST 15 10 - 40 U/L    ALT 13 10 - 44 U/L    Anion Gap 9 8 - 16 mmol/L    eGFR if African American >60.0 >60 mL/min/1.73 m^2    eGFR if non African American >60.0 >60 mL/min/1.73 m^2   TSH   Result Value Ref Range    TSH 2.422 0.400 - 4.000 uIU/mL   Urinalysis - clean catch   Result Value Ref Range    Specimen UA Urine, Clean Catch     Color, UA Valarie Yellow, Straw, Valarie    Appearance, UA Clear Clear    pH, UA 7.0 5.0 - 8.0    Specific Gravity, UA 1.025 1.005 - 1.030    Protein, UA 2+ (A) Negative    Glucose, UA Trace (A) Negative    Ketones, UA Trace (A) Negative    Bilirubin (UA) Negative Negative    Occult Blood UA Negative  Negative    Nitrite, UA Negative Negative    Urobilinogen, UA <2.0 <2.0 EU/dL    Leukocytes, UA Negative Negative   Drug screen panel, emergency   Result Value Ref Range    Benzodiazepines Negative     Methadone metabolites Negative     Cocaine (Metab.) Negative     Opiate Scrn, Ur Presumptive Positive     Barbiturate Screen, Ur Negative     Amphetamine Screen, Ur Negative     THC Negative     Phencyclidine Negative     Creatinine, Random Ur 131.7 23.0 - 375.0 mg/dL    Toxicology Information SEE COMMENT    Ethanol   Result Value Ref Range    Alcohol, Medical, Serum <10 <10 mg/dL   Acetaminophen level   Result Value Ref Range    Acetaminophen (Tylenol), Serum <3.0 (L) 10.0 - 20.0 ug/mL   Urinalysis Microscopic   Result Value Ref Range    RBC, UA 0 0 - 4 /hpf    WBC, UA 3 0 - 5 /hpf    Bacteria, UA Occasional None-Occ /hpf    Hyaline Casts, UA 0 0-1/lpf /lpf    Other (U/A) Mucus: Heavy None    Microscopic Comment SEE COMMENT          Imaging Results    None         Medications - No data to display    10:40 PM - Re-evaluation: The patient is resting comfortably and is in no acute distress. Dr. Welsh (telepsych) agrees that pt not currently suicidal and is currently not in need for any inpatient psychiatric treatment.  He states that his symptoms have improved after treatment within ER. Discussed test results, shared treatment plan, specific conditions for return, and importance of follow up with patient and family.  He understands and agrees with the plan as discussed. Answered  his questions at this time. He has remained hemodynamically stable throughout the ED course and is appropriate for discharge home.      Pre-hypertension/Hypertension: The pt has been informed that they may have pre-hypertension or hypertension based on a blood pressure reading in the ED. I recommend that the pt call the PCP listed on their discharge instructions or a physician of their choice this week to arrange f/u for further evaluation of  possible pre-hypertension or hypertension.     Quique Mon was given a handout which discussed their disease process, precautions, and instructions for follow-up and therapy.    Follow-up Information     Tye Cantor MD. Schedule an appointment as soon as possible for a visit in 1 week.    Specialty:  Internal Medicine  Contact information:  [address] [facility] [address]  [phone]             [facility].    Specialty:  Emergency Medicine  Why:  As needed, If symptoms worsen  Contact information:  [address] [address] [address] [phone]           [facility]. Schedule an appointment as soon as possible for a visit in 1 week.    Specialty:  Psychiatry  Contact information:  [address] [facility] [address] [phone]                 Discharge Medication List as of 2/16/2018 10:40 PM             ED Diagnosis  1. Alzheimer's dementia without behavioral disturbance, unspecified timing of dementia onset    2. Suicidal ideation                                Clinical Impression:   The primary encounter diagnosis was Alzheimer's dementia without behavioral disturbance, unspecified timing of dementia onset. A diagnosis of Suicidal ideation was also pertinent to this visit.    Disposition:   Disposition: Discharged  Condition: Stable                        Dieter Singh Jr., MD  02/17/18 0120

## 2018-02-17 NOTE — ED NOTES
Report given to Gregoria (patients nurse) at [facility] with time allowed for and all questions answered; informed that patient has been examined by the ER MD and a psychiatrist and both concur that patient does not need to be PEC for inpatient treatment and may return to the nursing home. Transportation requested for patients return with understanding verbalized.

## 2018-02-17 NOTE — CONSULTS
"Tele-Consultation to Emergency Department from Psychiatry    Please see previous notes:    Patient agreeable to consultation via telepsychiatry.    Consultation started: 2/16/2018 at 9:13pm  The chief complaint leading to psychiatric consultation is: concern for SI  This consultation was requested by Dr. Singh, the Emergency Department attending physician.  The location of the consulting psychiatrist is [facility].  The patient location is [facility].  The patient arrived at the ED at: not known    Also present with the patient at the time of the consultation: none    Patient Identification:  Quique Mon is a 77 y.o. male.    Patient information was obtained from patient.  Patient presented voluntarily to the Emergency Department by ambulance where the patient received see Ambulance Run Sheet prior to arrival.    History of Present Illness:  The patient is a 77 year old man with a reported history of dementia who presents from the nursing home that he resides after concern for SI He vehemently denies any SI and states that after going to [facility] in order to continue to have an abscess on his face addressed, he became upset and the plan of care and states that he referenced wanting to "quit it all." He denies stating that this was intention for suicide. He states that he has never had suicidal ideations ever. He denies any history of suicide attempts. He notes some irritability at times but denies any increase/worsening of this. He notes that he is seen by a psychiatrist monthly at the nursing home for depression and denies any prior psychiatric hospitalizations. He notes no significant stress relative to his living environment currently. He denies problems with energy/motivation to do things. He denies any recent change in appetite or sleep patterns.     Psychiatric History:   Hospitalization: No  Medication Trials: Yes, however he isn't certain what medication he is on  Suicide Attempts: " no  Violence: no  Depression: yes  Noris: no  AH's: no  Delusions: no    Review of Systems:  Psychological ROS: positive for - irritability, but no significant worsening    Past Medical History:   Past Medical History:   Diagnosis Date    Alzheimer disease     Anemia     Anxiety     Arthritis     Faith esophagus     BPH (benign prostatic hyperplasia)     Cerebrovascular disease     Dementia     Depression     GERD (gastroesophageal reflux disease)     HLD (hyperlipidemia)     Hypertension     URI (upper respiratory infection)         Seizures: no  Head trauma/l.o.c.: no  Wish to become pregnant[if female of childbearing age]: n/a    Allergies: fentanyl  Review of patient's allergies indicates:   Allergen Reactions    Fentanyl      Respiratory failure       Medications in ER: Medications - No data to display    Medications at home: not known    Substance Abuse History:   Alchohol: no  Drug: no     Legal History:   Past charges/incarcerations: no  Pending charges: no    Family Psychiatric History: not known    Social History:   History of Physical/Sexual Abuse: not known  Education: not known    Employment/Disability: retired   Financial: currently retired  Relationship Status/Sexual Orientation: , but doesn't reside with wife   Children: yes   Housing Status: lives in nursing home  Episcopalian: not known   History: not known   Recreational Activities: not known  Access to Gun: no     Current Evaluation:     Constitutional  Vitals:  Vitals:    02/16/18 2028 02/16/18 2125   BP: (!) 158/72    Pulse: 91    Resp: 20    Temp: 97.8 °F (36.6 °C)    TempSrc: Oral    SpO2: 95%    Weight:  100.2 kg (221 lb)      General:  unremarkable, age appropriate     Musculoskeletal  Muscle Strength/Tone:   moving arms normally   Gait & Station:   sitting on stretcher     Psychiatric  Level of Consciousness: alert  Orientation: oriented to person, place and time  Grooming: in hospital gown  Psychomotor Behavior:  no agitation  Speech: normal in rate, rhythm and volume  Language: uses words appropriately  Mood: appropriate, euthymic  Affect: congruent  Thought Process: linear  Associations: no looseness of association  Thought Content: denies SI/HI/AVH  Memory: grossly intact  Attention: intact to interview  Fund of Knowledge: appears adequate  Insight: fair  Judgement: fair    Relevant Elements of Neurological Exam: no abnormality of posture noted    Assessment - Diagnosis - Goals:     Diagnosis/Impression: The patient is a 77 year old man with a reported history of depression and dementia who presents to the ER from the nursing home after concern about him voicing Si. It appears that a statement he made secondary to frustration relative to a procedure he had done to his face and follow up regarding this was taken out of context. He denies any Si, active or passive currently or in the past. He has never made any suicide attempts. He identifies wanting to live for himself. He doesn't appear to be an imminent risk of danger to himself. He denies Hi. He is maintaining ADL's and thus isn't a danger to others nor is he gravely disabled. There appears to be no need for inpatient psychiatric hospitalization at this time. He should be able to return back to the nursing home and to continue with follow up with the psychiatrist who he sees regularly there.     Rec: 1. No need for PEC as he is NOT an imminent danger to himself/others and nor is he gravely disabled. 2. Follow with psychiatrist at nursing home on Monday. 3. Continue compliance with home medications     Time with patient: 10-15 minutes     More than 50% of the time was spent counseling/coordinating care    Laboratory Data:   Labs Reviewed   CBC W/ AUTO DIFFERENTIAL   COMPREHENSIVE METABOLIC PANEL   TSH   URINALYSIS   DRUG SCREEN PANEL, URINE EMERGENCY   ALCOHOL,MEDICAL (ETHANOL)   ACETAMINOPHEN LEVEL         Consulting clinician was informed of the encounter and consult  note.    Consultation ended: 2/16/2018 at 9:43pm

## 2018-02-17 NOTE — ED NOTES
Called [facility] for update on transport arrival for patient; informed that it would be about 10-15 minutes

## 2018-02-17 NOTE — ED NOTES
Telepsych consult initiated at this time. Spoke with Palmira at referral center. Psychiatrist to call back via telemedicine cart shortly.

## 2018-02-19 LAB
BACTERIA BLD CULT: NORMAL
BACTERIA BLD CULT: NORMAL

## 2019-06-24 ENCOUNTER — LAB VISIT (OUTPATIENT)
Dept: LAB | Facility: HOSPITAL | Age: 79
End: 2019-06-24
Attending: INTERNAL MEDICINE
Payer: MEDICARE

## 2019-06-24 DIAGNOSIS — E78.5 HYPERLIPEMIA: ICD-10-CM

## 2019-06-24 DIAGNOSIS — S72.143D: Primary | ICD-10-CM

## 2019-06-24 DIAGNOSIS — E11.40 DIABETIC NEUROPATHY: ICD-10-CM

## 2019-06-24 DIAGNOSIS — D51.9 VITAMIN B12 DEFICIENCY ANEMIA: ICD-10-CM

## 2019-06-24 DIAGNOSIS — E11.9 DIABETES MELLITUS WITHOUT COMPLICATION: ICD-10-CM

## 2019-06-24 DIAGNOSIS — E56.9 VITAMIN DEFICIENCY: ICD-10-CM

## 2019-06-24 DIAGNOSIS — D64.9 ANEMIA, UNSPECIFIED: ICD-10-CM

## 2019-06-24 LAB
ANION GAP SERPL CALC-SCNC: 8 MMOL/L (ref 8–16)
BASOPHILS # BLD AUTO: 0.01 K/UL (ref 0–0.2)
BASOPHILS NFR BLD: 0.2 % (ref 0–1.9)
BUN SERPL-MCNC: 13 MG/DL (ref 8–23)
CALCIUM SERPL-MCNC: 8.7 MG/DL (ref 8.7–10.5)
CHLORIDE SERPL-SCNC: 106 MMOL/L (ref 95–110)
CO2 SERPL-SCNC: 23 MMOL/L (ref 23–29)
CREAT SERPL-MCNC: 1 MG/DL (ref 0.5–1.4)
DIFFERENTIAL METHOD: ABNORMAL
EOSINOPHIL # BLD AUTO: 0.1 K/UL (ref 0–0.5)
EOSINOPHIL NFR BLD: 2.6 % (ref 0–8)
ERYTHROCYTE [DISTWIDTH] IN BLOOD BY AUTOMATED COUNT: 14.8 % (ref 11.5–14.5)
EST. GFR  (AFRICAN AMERICAN): >60 ML/MIN/1.73 M^2
EST. GFR  (NON AFRICAN AMERICAN): >60 ML/MIN/1.73 M^2
ESTIMATED AVG GLUCOSE: 105 MG/DL (ref 68–131)
FOLATE SERPL-MCNC: 11.3 NG/ML (ref 4–24)
GLUCOSE SERPL-MCNC: 210 MG/DL (ref 70–110)
HBA1C MFR BLD HPLC: 5.3 % (ref 4–5.6)
HCT VFR BLD AUTO: 31.8 % (ref 40–54)
HGB BLD-MCNC: 10.6 G/DL (ref 14–18)
LYMPHOCYTES # BLD AUTO: 1.4 K/UL (ref 1–4.8)
LYMPHOCYTES NFR BLD: 25.7 % (ref 18–48)
MCH RBC QN AUTO: 31.1 PG (ref 27–31)
MCHC RBC AUTO-ENTMCNC: 33.3 G/DL (ref 32–36)
MCV RBC AUTO: 93 FL (ref 82–98)
MONOCYTES # BLD AUTO: 0.3 K/UL (ref 0.3–1)
MONOCYTES NFR BLD: 6.3 % (ref 4–15)
NEUTROPHILS # BLD AUTO: 3.5 K/UL (ref 1.8–7.7)
NEUTROPHILS NFR BLD: 65.2 % (ref 38–73)
PLATELET # BLD AUTO: 199 K/UL (ref 150–350)
PMV BLD AUTO: 8.3 FL (ref 9.2–12.9)
POTASSIUM SERPL-SCNC: 4.2 MMOL/L (ref 3.5–5.1)
PREALB SERPL-MCNC: 13 MG/DL (ref 20–43)
RBC # BLD AUTO: 3.41 M/UL (ref 4.6–6.2)
SODIUM SERPL-SCNC: 137 MMOL/L (ref 136–145)
T4 SERPL-MCNC: 6.7 UG/DL (ref 4.5–11.5)
TSH SERPL DL<=0.005 MIU/L-ACNC: 1.11 UIU/ML (ref 0.4–4)
VIT B12 SERPL-MCNC: 250 PG/ML (ref 210–950)
WBC # BLD AUTO: 5.38 K/UL (ref 3.9–12.7)

## 2019-06-24 PROCEDURE — 80048 BASIC METABOLIC PNL TOTAL CA: CPT | Mod: PO

## 2019-06-24 PROCEDURE — 85025 COMPLETE CBC W/AUTO DIFF WBC: CPT | Mod: PO

## 2019-06-24 PROCEDURE — 84134 ASSAY OF PREALBUMIN: CPT

## 2019-06-24 PROCEDURE — 36415 COLL VENOUS BLD VENIPUNCTURE: CPT | Mod: PO

## 2019-06-24 PROCEDURE — 82607 VITAMIN B-12: CPT

## 2019-06-24 PROCEDURE — 84443 ASSAY THYROID STIM HORMONE: CPT | Mod: PO

## 2019-06-24 PROCEDURE — 84436 ASSAY OF TOTAL THYROXINE: CPT

## 2019-06-24 PROCEDURE — 82746 ASSAY OF FOLIC ACID SERUM: CPT

## 2019-06-24 PROCEDURE — 83036 HEMOGLOBIN GLYCOSYLATED A1C: CPT

## 2023-11-29 NOTE — ED NOTES
Ketorolac - no PA required.    Writer ran test claim through patients primary and secondary insurance, received a paid claim.        Please advise.    EPA will exit encounter.    Pt agitated and acting belligerent with staff. [employer] officer at bedside talking to pt.